# Patient Record
Sex: FEMALE | URBAN - METROPOLITAN AREA
[De-identification: names, ages, dates, MRNs, and addresses within clinical notes are randomized per-mention and may not be internally consistent; named-entity substitution may affect disease eponyms.]

---

## 2022-11-29 ENCOUNTER — OFFICE VISIT (OUTPATIENT)
Dept: URBAN - METROPOLITAN AREA CLINIC 42 | Facility: CLINIC | Age: 57
End: 2022-11-29
Payer: COMMERCIAL

## 2022-11-29 DIAGNOSIS — H16.223 KERATOCONJUNCTIVITIS SICCA, BILATERAL: Primary | ICD-10-CM

## 2022-11-29 PROCEDURE — 68761 CLOSE TEAR DUCT OPENING: CPT | Performed by: OPHTHALMOLOGY

## 2022-11-29 PROCEDURE — 99213 OFFICE O/P EST LOW 20 MIN: CPT | Performed by: OPHTHALMOLOGY

## 2022-11-29 ASSESSMENT — INTRAOCULAR PRESSURE
OS: 19
OD: 18

## 2022-11-29 NOTE — IMPRESSION/PLAN
Impression: Keratoconjunctivitis sicca, bilateral: K14.054. Plan: Discussed need for punctal plugs to hold tears in eyes. Inserted extended plugs today in RLL. Patient will continue with artificial tears as directed. Discussed not to rub eyes and that plugs may come out over time. Pt will call back to schedule procedure at [place] [facility] ([facility]) school office to cauterize punctus if no improvement with 6 month plug inserted today. Continue AT's OU QID and AT's gel QHS OU. Start FML BID OU, Erx to pharmacy.